# Patient Record
Sex: MALE | Race: OTHER | HISPANIC OR LATINO | URBAN - METROPOLITAN AREA
[De-identification: names, ages, dates, MRNs, and addresses within clinical notes are randomized per-mention and may not be internally consistent; named-entity substitution may affect disease eponyms.]

---

## 2022-01-01 ENCOUNTER — EMERGENCY (EMERGENCY)
Facility: HOSPITAL | Age: 32
LOS: 0 days | Discharge: HOME | End: 2022-01-01
Attending: EMERGENCY MEDICINE
Payer: SELF-PAY

## 2022-01-01 VITALS
OXYGEN SATURATION: 98 % | SYSTOLIC BLOOD PRESSURE: 132 MMHG | RESPIRATION RATE: 17 BRPM | WEIGHT: 179.9 LBS | HEART RATE: 90 BPM | DIASTOLIC BLOOD PRESSURE: 82 MMHG

## 2022-01-01 DIAGNOSIS — F10.229 ALCOHOL DEPENDENCE WITH INTOXICATION, UNSPECIFIED: ICD-10-CM

## 2022-01-01 PROCEDURE — 99285 EMERGENCY DEPT VISIT HI MDM: CPT

## 2022-01-01 PROCEDURE — 99284 EMERGENCY DEPT VISIT MOD MDM: CPT

## 2022-01-01 NOTE — ED PROVIDER NOTE - IV ALTEPLASE INCLUSION HIDDEN
Problem: Potential for Falls  Goal: Patient will remain free of falls  Description: INTERVENTIONS:  - Assess patient frequently for physical needs  -  Identify cognitive and physical deficits and behaviors that affect risk of falls. -  [place] fall precautions as indicated by assessment.  - Educate patient/family on patient safety including physical limitations  - Instruct patient to call for assistance with activity based on assessment  - Modify environment to reduce risk of injury  - Consider OT/PT consult to assist with strengthening/mobility  Outcome: Progressing     Problem: SAFETY ADULT  Goal: Patient will remain free of falls  Description: INTERVENTIONS:  - Assess patient frequently for physical needs  -  Identify cognitive and physical deficits and behaviors that affect risk of falls.   -  [place] fall precautions as indicated by assessment.  - Educate patient/family on patient safety including physical limitations  - Instruct patient to call for assistance with activity based on assessment  - Modify environment to reduce risk of injury  - Consider OT/PT consult to assist with strengthening/mobility  Outcome: Progressing show

## 2022-01-01 NOTE — ED PROVIDER NOTE - CLINICAL SUMMARY MEDICAL DECISION MAKING FREE TEXT BOX
30 y/o male with no significant PMHx BIBEMS after being found publically intoxicated screaming and sleeping on the ground. Denies n/v. Pt states that he is fine and has no medical complaints at this time. Physical exam unremarkable other than clinical intoxication. Pt stable. because pt with threatening behavior in ED (+) expedite escort out of ED.

## 2022-01-01 NOTE — ED PROVIDER NOTE - MUSCULOSKELETAL, MLM
Spine appears normal, range of motion is not limited, no muscle or joint tenderness. No focal tenderness or swelling.

## 2022-01-01 NOTE — ED PROVIDER NOTE - PATIENT PORTAL LINK FT
You can access the FollowMyHealth Patient Portal offered by [facility] by registering at the following website: [email or web address]. By joining [employer]’s FollowMyHealth portal, you will also be able to view your health information using other applications (apps) compatible with our system.

## 2022-01-01 NOTE — ED PROVIDER NOTE - OBJECTIVE STATEMENT
28 y/o male with no significant PMHx BIBEMS after being found publically intoxicated screaming and sleeping on the ground. Denies n/v. Pt states that he is fine and has no medical complaints at this time.

## 2022-01-01 NOTE — ED ADULT TRIAGE NOTE - CHIEF COMPLAINT QUOTE
pt p/w c/o public intoxication, picked up because the patient was found sleeping outside.  Upon arrival pt is alert and ambulating.  Multiple attempts at obtaining name, , demographics but unsuccessful despite in person .

## 2022-01-01 NOTE — ED PROVIDER NOTE - NSFOLLOWUPINSTRUCTIONS_ED_ALL_ED_FT
Avoid abusing alcohol.      Abuse of Alcohol    WHAT YOU NEED TO KNOW:    Alcohol abuse means you drink more than the recommended daily or weekly limits. You may be drinking alcohol regularly or drinking large amounts in a short period of time (binge drinking). You continue to drink even when it causes legal, work, or relationship problems.    DISCHARGE INSTRUCTIONS:    Call your local emergency number (911 in the ), or have someone call if:   •You have sudden chest pain or trouble breathing.      •You want to harm yourself or others.      •You have a seizure.      Seek care immediately if:   •You cannot stop vomiting or you vomit blood.          Call your doctor if:   •You have hallucinations (you see or hear things that are not real).      •You have questions or concerns about your condition or care.      Medicines:   •Vitamin supplements may be given to treat low vitamin levels. Alcohol can make it hard for your body to absorb enough vitamins such as B1. Vitamin supplements may also be given to prevent alcohol-related brain damage.       •Take your medicine as directed. Contact your healthcare provider if you think your medicine is not helping or if you have side effects. Tell him or her if you are allergic to any medicine. Keep a list of the medicines, vitamins, and herbs you take. Include the amounts, and when and why you take them. Bring the list or the pill bottles to follow-up visits. Carry your medicine list with you in case of an emergency.      Health problems alcohol abuse can cause:   •Cancer in your liver, pancreas, stomach, colon, kidney, or breast      •Stroke or a heart attack      •Liver, kidney, or lung disease      •Blackouts, memory loss, brain damage, or dementia      •Diabetes, immune system problems, or thiamine (vitamin B1) deficiency      •Problems for you and your baby if you drink while pregnant      Recommended alcohol limits: One drink is defined as 12 oz of beer, 5 oz of wine, or 1.5 oz of liquor such as whiskey.  •Men 21 to 64 years should limit alcohol to 2 drinks a day. Do not have more than 4 drinks in 1 day or more than 14 in 1 week.      •All women, and men 65 or older should limit alcohol to 1 drink in a day. Do not have more than 3 drinks in 1 day or more than 7 in 1 week. Do not drink alcohol if you are pregnant.      Manage alcohol use:   •Work with healthcare providers on goals to drink less. This can help prevent health problems. For example, you may start by planning your weekly alcohol use. It will be easier to have fewer drinks if you plan ahead.      •Have food when you drink alcohol. Food will prevent alcohol from getting into your system too quickly. Eat before you have your first alcohol drink.      •Time your drinks carefully. Have no more than 1 drink in an hour. Have a liquid such as water, coffee, or a soft drink between alcohol drinks.      •Do not drive if you have had alcohol. Plan ahead so you have a safe ride home. Make sure someone who has not been drinking can help you get home safely. Plan to use a taxi or other ride service, if needed.      •Do not drink alcohol if you are taking medicine. Alcohol is dangerous when you combine it with certain medicines, such as acetaminophen or blood pressure medicine. Talk to your healthcare provider about all the medicines you currently take.      Follow up with your doctor as directed: Write down your questions so you remember to ask them during your visits.    For support and more information:   •Alcoholics Anonymous  Web Address: http://www.aa.org      •Substance Abuse and Mental Health Services Administration  [address]  [place] [address]  Web Address: http://www.samhsa.gov

## 2022-01-01 NOTE — ED PROVIDER NOTE - PROGRESS NOTE DETAILS
Tripp Martinez : Pt seen walking up to another ED pt and flicking on and off his lighter. No active medical indication to keep pt in ED. Pt requests dc. Will expedite dc out of ED with . MIGUEL Martinez MD:  The scribe's documentation has been prepared under my direction and personally reviewed by me in its entirety. I confirm that the note above accurately reflects all work, treatment, procedures, and medical decision making performed by me (Dr. Martinez). Tripp Martinez : Pt seen walking up to another ED pt and flicking on and off his lighter in front of the other ED patient. No active medical indication to keep pt in ED. Pt constitutes a physical threat within the ED. Will expedite dc out of ED with .

## 2022-10-22 ENCOUNTER — EMERGENCY (EMERGENCY)
Facility: HOSPITAL | Age: 32
LOS: 0 days | Discharge: HOME | End: 2022-10-23
Attending: EMERGENCY MEDICINE
Payer: SELF-PAY

## 2022-10-22 VITALS
SYSTOLIC BLOOD PRESSURE: 148 MMHG | OXYGEN SATURATION: 99 % | DIASTOLIC BLOOD PRESSURE: 81 MMHG | TEMPERATURE: 99 F | WEIGHT: 160.06 LBS | HEIGHT: 62 IN | RESPIRATION RATE: 20 BRPM | HEART RATE: 128 BPM

## 2022-10-22 DIAGNOSIS — F10.129 ALCOHOL ABUSE WITH INTOXICATION, UNSPECIFIED: ICD-10-CM

## 2022-10-22 LAB
ADD ON TEST-SPECIMEN IN LAB: SIGNIFICANT CHANGE UP
ALBUMIN SERPL ELPH-MCNC: 4.4 G/DL — SIGNIFICANT CHANGE UP (ref 3.3–5)
ALP SERPL-CCNC: 88 U/L — SIGNIFICANT CHANGE UP (ref 40–120)
ALT FLD-CCNC: 118 U/L — HIGH (ref 12–78)
ANION GAP SERPL CALC-SCNC: 8 MMOL/L — SIGNIFICANT CHANGE UP (ref 5–17)
APTT BLD: 36.9 SEC — HIGH (ref 27.5–35.5)
AST SERPL-CCNC: 57 U/L — HIGH (ref 15–37)
BASOPHILS # BLD AUTO: 0.06 K/UL — SIGNIFICANT CHANGE UP (ref 0–0.2)
BASOPHILS NFR BLD AUTO: 1.2 % — SIGNIFICANT CHANGE UP (ref 0–2)
BILIRUB SERPL-MCNC: 0.3 MG/DL — SIGNIFICANT CHANGE UP (ref 0.2–1.2)
BUN SERPL-MCNC: 8 MG/DL — SIGNIFICANT CHANGE UP (ref 7–23)
CALCIUM SERPL-MCNC: 8.8 MG/DL — SIGNIFICANT CHANGE UP (ref 8.5–10.1)
CHLORIDE SERPL-SCNC: 107 MMOL/L — SIGNIFICANT CHANGE UP (ref 96–108)
CO2 SERPL-SCNC: 28 MMOL/L — SIGNIFICANT CHANGE UP (ref 22–31)
CREAT SERPL-MCNC: 1.01 MG/DL — SIGNIFICANT CHANGE UP (ref 0.5–1.3)
EGFR: 104 ML/MIN/1.73M2 — SIGNIFICANT CHANGE UP
EOSINOPHIL # BLD AUTO: 0.02 K/UL — SIGNIFICANT CHANGE UP (ref 0–0.5)
EOSINOPHIL NFR BLD AUTO: 0.4 % — SIGNIFICANT CHANGE UP (ref 0–6)
GLUCOSE SERPL-MCNC: 128 MG/DL — HIGH (ref 70–99)
HCT VFR BLD CALC: 46.4 % — SIGNIFICANT CHANGE UP (ref 39–50)
HGB BLD-MCNC: 16.7 G/DL — SIGNIFICANT CHANGE UP (ref 13–17)
IMM GRANULOCYTES NFR BLD AUTO: 0.2 % — SIGNIFICANT CHANGE UP (ref 0–0.9)
INR BLD: 1.01 RATIO — SIGNIFICANT CHANGE UP (ref 0.88–1.16)
LYMPHOCYTES # BLD AUTO: 2.54 K/UL — SIGNIFICANT CHANGE UP (ref 1–3.3)
LYMPHOCYTES # BLD AUTO: 50.3 % — HIGH (ref 13–44)
MCHC RBC-ENTMCNC: 31.7 PG — SIGNIFICANT CHANGE UP (ref 27–34)
MCHC RBC-ENTMCNC: 36 GM/DL — SIGNIFICANT CHANGE UP (ref 32–36)
MCV RBC AUTO: 88.2 FL — SIGNIFICANT CHANGE UP (ref 80–100)
MONOCYTES # BLD AUTO: 0.22 K/UL — SIGNIFICANT CHANGE UP (ref 0–0.9)
MONOCYTES NFR BLD AUTO: 4.4 % — SIGNIFICANT CHANGE UP (ref 2–14)
NEUTROPHILS # BLD AUTO: 2.2 K/UL — SIGNIFICANT CHANGE UP (ref 1.8–7.4)
NEUTROPHILS NFR BLD AUTO: 43.5 % — SIGNIFICANT CHANGE UP (ref 43–77)
PLATELET # BLD AUTO: 433 K/UL — HIGH (ref 150–400)
POTASSIUM SERPL-MCNC: 3.9 MMOL/L — SIGNIFICANT CHANGE UP (ref 3.5–5.3)
POTASSIUM SERPL-SCNC: 3.9 MMOL/L — SIGNIFICANT CHANGE UP (ref 3.5–5.3)
PROT SERPL-MCNC: 8.6 GM/DL — HIGH (ref 6–8.3)
PROTHROM AB SERPL-ACNC: 11.7 SEC — SIGNIFICANT CHANGE UP (ref 10.5–13.4)
RBC # BLD: 5.26 M/UL — SIGNIFICANT CHANGE UP (ref 4.2–5.8)
RBC # FLD: 11.8 % — SIGNIFICANT CHANGE UP (ref 10.3–14.5)
SODIUM SERPL-SCNC: 143 MMOL/L — SIGNIFICANT CHANGE UP (ref 135–145)
TROPONIN I, HIGH SENSITIVITY RESULT: 6.59 NG/L — SIGNIFICANT CHANGE UP
WBC # BLD: 5.05 K/UL — SIGNIFICANT CHANGE UP (ref 3.8–10.5)
WBC # FLD AUTO: 5.05 K/UL — SIGNIFICANT CHANGE UP (ref 3.8–10.5)

## 2022-10-22 PROCEDURE — 99285 EMERGENCY DEPT VISIT HI MDM: CPT | Mod: 25

## 2022-10-22 PROCEDURE — 80053 COMPREHEN METABOLIC PANEL: CPT

## 2022-10-22 PROCEDURE — 85025 COMPLETE CBC W/AUTO DIFF WBC: CPT

## 2022-10-22 PROCEDURE — 84484 ASSAY OF TROPONIN QUANT: CPT

## 2022-10-22 PROCEDURE — 72125 CT NECK SPINE W/O DYE: CPT | Mod: MD

## 2022-10-22 PROCEDURE — 72125 CT NECK SPINE W/O DYE: CPT | Mod: 26,MD

## 2022-10-22 PROCEDURE — 70450 CT HEAD/BRAIN W/O DYE: CPT | Mod: 26,MD

## 2022-10-22 PROCEDURE — 36415 COLL VENOUS BLD VENIPUNCTURE: CPT

## 2022-10-22 PROCEDURE — 70450 CT HEAD/BRAIN W/O DYE: CPT | Mod: MD

## 2022-10-22 PROCEDURE — 96374 THER/PROPH/DIAG INJ IV PUSH: CPT

## 2022-10-22 PROCEDURE — 80307 DRUG TEST PRSMV CHEM ANLYZR: CPT

## 2022-10-22 PROCEDURE — 99285 EMERGENCY DEPT VISIT HI MDM: CPT

## 2022-10-22 PROCEDURE — 85610 PROTHROMBIN TIME: CPT

## 2022-10-22 PROCEDURE — 85730 THROMBOPLASTIN TIME PARTIAL: CPT

## 2022-10-22 RX ORDER — SODIUM CHLORIDE 9 MG/ML
1000 INJECTION INTRAMUSCULAR; INTRAVENOUS; SUBCUTANEOUS ONCE
Refills: 0 | Status: COMPLETED | OUTPATIENT
Start: 2022-10-22 | End: 2022-10-22

## 2022-10-22 RX ADMIN — Medication 1 MILLIGRAM(S): at 22:44

## 2022-10-22 RX ADMIN — SODIUM CHLORIDE 1000 MILLILITER(S): 9 INJECTION INTRAMUSCULAR; INTRAVENOUS; SUBCUTANEOUS at 22:07

## 2022-10-22 NOTE — ED PROVIDER NOTE - PATIENT PORTAL LINK FT
You can access the FollowMyHealth Patient Portal offered by [facility] by registering at the following website: [email or web address]. By joining [employer]’s FollowMyHealth portal, you will also be able to view your health information using other applications (apps) compatible with our system.

## 2022-10-22 NOTE — ED PROVIDER NOTE - OBJECTIVE STATEMENT
28 y/o  M with no pertinent PMHx presents to the ED [facility] [facility] [place] [facility] badge #4720 c/o intoxication. Pt walked into [employer] stumbling and bystander called 911. Denies pain at this time. Unsure if he hit head. 28 y/o  male with no pertinent PMHx presents to the ED [facility] [place] [facility] badge #8129 c/o intoxication. Pt walked into [employer] stumbling and bystander called 911. Denies pain at this time. Unsure if he hit head. No cp, sob or palpitation. No abdominal pain. No hip pain. No weakness in arms or legs. Hx otherwise limited due to presence of alcohol.

## 2022-10-22 NOTE — ED PROVIDER NOTE - NS_ ATTENDINGSCRIBEDETAILS _ED_A_ED_FT
I Mayito Larsen MD saw and examined the patient. Scribe documented for me and under my supervision. I have modified the scribe's documentation where necessary to reflect my history, physical exam and other relevant documentations pertinent to the care of the patient.

## 2022-10-22 NOTE — ED PROVIDER NOTE - PROGRESS NOTE DETAILS
Suzie CROWDER: Signed out the care of the patient to Dr. Blackburn. Pending CT reports, labs. Patient intoxicated,  AOB, possible trauma to head as per PD. Patient has no chest pain, abdominal pain, or HA or neck pain. Unable to clear patient or assess him now due to high suspicion for ETOH intoxication. Signed out the care of the patient to Markus Corbin pending repeat assessment, follow up with labs and imaging. Sign out is appreciated.

## 2022-10-22 NOTE — ED PROVIDER NOTE - NSFOLLOWUPINSTRUCTIONS_ED_ALL_ED_FT
Alcohol Abuse    Alcohol intoxication occurs when the amount of alcohol that a person has consumed impairs his or her ability to mentally and physically function. Chronic alcohol consumption can also lead to a variety of health issues including neurological disease, stomach disease, heart disease, liver disease, etc. Do not drive after drinking alcohol. Drinking enough alcohol to end up in an Emergency Room suggests you may have an alcohol abuse problem. Seek help at a drug addiction center.    SEEK IMMEDIATE MEDICAL CARE IF YOU HAVE ANY OF THE FOLLOWING SYMPTOMS: seizures, vomiting blood, blood in your stool, lightheadedness/dizziness, or becoming shaky to tremulous when you stop drinking.    Intoxicación alcohólica  Alcohol Intoxication    La intoxicación alcohólica ocurre cuando destiny persona ya no piensa con claridad ni se desempeña mignon (experimenta un deterioro) después de beber bebidas alcohólicas. La intoxicación alcohólica puede ocurrir incluso con destiny copa. El nivel de deterioro depende de lo siguiente:    La cantidad de alcohol que la persona tomó.  La edad, el sexo y el peso de la persona.  La frecuencia con la que la persona derian.  Si la persona tiene otras enfermedades, tales maryann diabetes, convulsiones o destiny afección cardíaca.    La intoxicación alcohólica puede variar en gravedad, desde leve hasta grave. La afección puede ser peligrosa, especialmente cuando se beben grandes cantidades de alcohol en un corto período de tiempo (borrachera) o si la persona también tomó medicamentos recetados o consumió drogas recreativas.    ¿Cuáles son los signos o los síntomas?  Los síntomas de intoxicación alcohólica leve incluyen los siguientes:    Sensación de relajación o somnolencia.  Leve dificultad con:  La coordinación.  El habla.  La memoria.  La atención.    Los síntomas de intoxicación alcohólica moderada incluyen los siguientes:    Emociones extremas, maryann enojo o tristeza.  Dificultad moderada con:  La coordinación.  El habla.  La memoria.  La atención.    Los síntomas de intoxicación alcohólica grave incluyen los siguientes:    Desmayo.  Vómitos.  Confusión.  Respiración lenta.  Coma.  Dificultad grave con:  La coordinación.  El habla.  La memoria.  La atención.    La intoxicación alcohólica, especialmente en personas que no están expuestas al alcohol, con frecuencia puede avanzar de leve a grave rápidamente y hasta puede causar coma o la muerte.    ¿Cómo se diagnostica?  Esta afección se puede diagnosticar en función de lo siguiente:    Los antecedentes médicos.  Un examen físico.  Un análisis de jac que mide la concentración de alcohol en la jac (blood alcohol content, DANYA).  Si hay olor a alcohol en la respiración.    Pineda médico le preguntará la cantidad y el tipo de bebida alcohólica que bebió.    ¿Cómo se trata?  Generalmente, no se requiere un tratamiento para esta afección. La mayoría de los efectos del alcohol son temporales y desaparecen a medida que el alcohol dimitry el cuerpo de forma natural. El médico puede recomendar un monitoreo hasta que el nivel de alcohol comience a bajar y sea seguro volver a casa. También es posible que le administren líquidos a través de destiny vía intravenosa (IV) para ayudar a evitar la deshidratación. Si la intoxicación alcohólica es grave, es posible que se necesite destiny máquina para respirar denominada "ventilador" para ayudarle a respirar.    Siga estas instrucciones en pineda casa:  No conduzca vehículos después de beber alcohol.  Pídale a alguna persona que se quede con usted mientras está embriagado. No debe quedarse solo.  Manténgase hidratado. Chana suficiente líquido para mantener la orina ravi o de color amarillo pálido.  Evite la cafeína ya que puede deshidratarlo.  [place] los medicamentos de venta jarod y los recetados solamente maryann se lo haya indicado el médico.     ¿Cómo se mena?  Para evitar la intoxicación alcohólica:    Limite el consumo de alcohol a no más de 1 medida por día si es jeanna y no está embarazada y a 2 medidas por día si es hombre. Destiny medida equivale a 12 onzas de cerveza, 5 onzas de vino o 1½ onzas de bebidas alcohólicas de jazz graduación.  No chana alcohol con el estómago vacío.  Evite el consumo de alcohol si:  No tiene la edad legal para beber.  Está embarazada o podría estarlo.  Está tomando medicamentos que no se deben diamante con alcohol.  Beber empeora pineda enfermedad.  Tiene que conducir o realizar actividades que requieren pineda atención.  Tiene un trastorno por abuso de sustancias.    Para evitar las complicaciones posiblemente graves de la intoxicación alcohólica, busque atención médica de inmediato si usted o destiny persona que conoce tiene signos de intoxicación alcohólica moderada o grave. Estos incluyen los siguientes:    Dificultad moderada o grave con:  La coordinación.  El habla.  La memoria.  La atención.  Desmayo.  Confusión.  Vómitos.    No deje a destiny persona kathya si está embriagada.    Comuníquese con un médico si:  No mejora luego de algunos días.  Tiene problemas en el trabajo, la escuela o en pineda casa debido al consumo de alcohol.    Solicite ayuda de inmediato si:  Se siente inestable cuando intenta abandonar el consumo de alcohol.  Comienza a temblar de manera incontrolable (tiene convulsiones).  Vomita jac. La jac en el vómito puede ser de color ramírez brillante o similar a los granos de café.  Observa jac en la materia fecal. La jac en la materia fecal puede ser de color ramírez brillante o hacer que la materia fecal se ariana de color lamine alquitranado y huela mal.  Se siente mareado o se desmaya.    Si alguna vez siente que puede lastimarse o lastimar a los demás, o tiene pensamientos de poner fin a pineda kendra, busque ayuda de inmediato. Puede dirigirse al servicio de urgencias más cercano o comunicarse con:    El servicio de emergencias de pineda localidad (911 en los [place]).  Destiny línea de asistencia al suicida y atención en crisis, maryann la Línea Nacional de Prevención del Suicidio (National Suicide Prevention Lifeline) al [phone]. Está disponible las 24 horas del día.

## 2022-10-22 NOTE — ED ADULT NURSE NOTE - OBJECTIVE STATEMENT
30 y/o male awake alert but intoxicated presents to ED accompanied with TUSHARD Татьяна #[id number] for wandering near [place]. pt walked into [employer] stumbling and bystander called 911. denies any pain or complaints.

## 2022-10-22 NOTE — ED PROVIDER NOTE - CONSTITUTIONAL, MLM
Well appearing, awake, alert, oriented to person, place, time/situation and in no apparent distress. ETOH on breath normal... Well appearing, awake, alert, oriented to person, place, time/situation and in no apparent distress. ETOH on breath.

## 2022-10-22 NOTE — ED ADULT NURSE NOTE - CHIEF COMPLAINT QUOTE
pt BIBEMS and SCPD 1596 c/o intoxication. pt [facility] [employer] stumbling and bystander called 911. pt denies pain at this time

## 2022-10-22 NOTE — ED ADULT TRIAGE NOTE - CHIEF COMPLAINT QUOTE
pt BIBEMS and SCPD 0293 c/o intoxication. pt walked [facility] [employer] stumbling and bystander called 911. pt denies pain at this time

## 2022-10-22 NOTE — ED PROVIDER NOTE - NEUROLOGICAL, MLM
Alert and oriented, no focal deficits, no motor or sensory deficits. 5/5 strength UE and LE Alert and oriented, no focal deficits, no motor or sensory deficits. 5/5 strength UE and LE on flexion and extension of all limbs. CNs 2-12 intact. NIH= can not fully assess, GCS=15

## 2022-10-23 VITALS
SYSTOLIC BLOOD PRESSURE: 137 MMHG | DIASTOLIC BLOOD PRESSURE: 81 MMHG | RESPIRATION RATE: 18 BRPM | OXYGEN SATURATION: 99 % | HEART RATE: 111 BPM | TEMPERATURE: 98 F

## 2022-10-23 NOTE — ED ADULT NURSE REASSESSMENT NOTE - NS ED NURSE REASSESS COMMENT FT1
Patient agreed to all verbal and written discharge instructions. Patient agreed to follow up with PCP/PMD. Patient education preformed on signs/symptoms to return to the ED. Patient is alert, oriented, and ambulatory at time of discharge. Pt picked up by family member.